# Patient Record
(demographics unavailable — no encounter records)

---

## 2025-02-12 NOTE — HEALTH RISK ASSESSMENT
[[id number]] : 0 [Change in mental status noted] : No change in mental status noted [Language] : denies difficulty with language [Behavior] : denies difficulty with behavior [Learning/Retaining New Information] : denies difficulty learning/retaining new information [Handling Complex Tasks] : denies difficulty handling complex tasks [Reasoning] : denies difficulty with reasoning [Spatial Ability and Orientation] : denies difficulty with spatial ability and orientation [Sexually Active] : not sexually active [Reports changes in hearing] : Reports no changes in hearing [Reports changes in vision] : Reports no changes in vision [Reports changes in dental health] : Reports no changes in dental health [Travel to Developing Areas] : does not  travel to developing areas [TB Exposure] : is not being exposed to tuberculosis

## 2025-02-12 NOTE — HEALTH RISK ASSESSMENT
[[id number]] : 0 [Change in mental status noted] : No change in mental status noted [Language] : denies difficulty with language [Behavior] : denies difficulty with behavior [Learning/Retaining New Information] : denies difficulty learning/retaining new information [Handling Complex Tasks] : denies difficulty handling complex tasks [Reasoning] : denies difficulty with reasoning [Spatial Ability and Orientation] : denies difficulty with spatial ability and orientation [Sexually Active] : not sexually active [Reports changes in hearing] : Reports no changes in hearing [Reports changes in vision] : Reports no changes in vision [Reports changes in dental health] : Reports no changes in dental health [Travel to Developing Areas] : does not  travel to developing areas [TB Exposure] : is not being exposed to tuberculosis

## 2025-02-12 NOTE — HISTORY OF PRESENT ILLNESS
[de-identified] : This is a 77 ear-old patient in good health aside from a borderline hypercholesterolemia, history of kidney stones, and mild anxiety.  She is here today for her annual well examination .  She also would like to be started on a weight reducing medication

## 2025-02-12 NOTE — HISTORY OF PRESENT ILLNESS
[de-identified] : This is a 77 ear-old patient in good health aside from a borderline hypercholesterolemia, history of kidney stones, and mild anxiety.  She is here today for her annual well examination .  She also would like to be started on a weight reducing medication

## 2025-02-12 NOTE — ASSESSMENT
[FreeTextEntry1] : Problems Hypercholesterolemia Renal colic Microhematuria Mild anxiety Reflux esophagitis Assessment Her blood pressure was well controlled her physical examination was normal.  The patient is due for mammogram, colonoscopy, upper endoscopy. Zepbound 2-1/2 mg weekly was ordered kermite advised the patient has the risk of nausea vomiting abdominal pain intestinal obstruction

## 2025-02-12 NOTE — CURRENT MEDS
[Takes medication as prescribed] : takes [None] : Patient does not have any barriers to medication adherence [Yes] : Reviewed medication list for presence of high-risk medications. [Benzodiazepines] : benzodiazepines [Opioids] : opioids [Blood Thinners] : blood thinners [Muscle Relaxants] : muscle relaxants [Sedatives] : sedatives [FreeTextEntry1] : Patient takes none of the above